# Patient Record
Sex: FEMALE
[De-identification: names, ages, dates, MRNs, and addresses within clinical notes are randomized per-mention and may not be internally consistent; named-entity substitution may affect disease eponyms.]

---

## 2023-08-03 ENCOUNTER — APPOINTMENT (OUTPATIENT)
Dept: ORTHOPEDIC SURGERY | Facility: CLINIC | Age: 78
End: 2023-08-03
Payer: MEDICARE

## 2023-08-03 VITALS — HEIGHT: 63 IN | WEIGHT: 120 LBS | BODY MASS INDEX: 21.26 KG/M2

## 2023-08-03 DIAGNOSIS — M72.0 PALMAR FASCIAL FIBROMATOSIS [DUPUYTREN]: ICD-10-CM

## 2023-08-03 PROBLEM — Z00.00 ENCOUNTER FOR PREVENTIVE HEALTH EXAMINATION: Status: ACTIVE | Noted: 2023-08-03

## 2023-08-03 PROCEDURE — 99203 OFFICE O/P NEW LOW 30 MIN: CPT

## 2023-08-03 NOTE — ADDENDUM
[FreeTextEntry1] : I, Arianne Farfan, acted solely as a scribe for Dr. Moore on this date on 08/03/2023.

## 2023-08-03 NOTE — PHYSICAL EXAM
[de-identified] : - Constitutional: This is a female in no obvious distress.   - Psych: Patient is alert and oriented to person, place and time.  Patient has a normal mood and affect. - Cardiovascular: Normal pulses throughout the upper extremities.  No significant varicosities are noted in the upper extremities.  - Neuro: Strength and sensation are intact throughout the upper extremities.  Patient has normal coordination. - Respiratory:  Patient exhibits no evidence of shortness of breath or difficulty breathing. - Skin: No rashes, lesions, or other abnormalities are noted in the upper extremities.  ---  Examination of her left hand demonstrates Dupuytren's disease along the little finger ulnarly.  She has a cord along the ulnar aspect of the digit.  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  She has full flexion.  She is neurovascularly intact distally.

## 2023-08-03 NOTE — HISTORY OF PRESENT ILLNESS
[Right] : right hand dominant [FreeTextEntry1] : She comes in today for evaluation of left little finger little cyst that started years ago. She denies pain, numbness and tingling. Furthermore, she is unable to straighten her left little finger fully. She is able to bend her finger. Moreover, her  strength is good.

## 2023-08-03 NOTE — DISCUSSION/SUMMARY
[FreeTextEntry1] : She has findings consistent with left little finger Dupuytren's disease with a PIP joint flexion contracture.  She denies symptoms.  I had a discussion regarding today's visit, the prognosis of this diagnosis and treatment recommendations and options.  At this time, as she denies symptoms, she deferred any intervention.  I discussed with her the potential benefit of a Xiaflex injection in the future if warranted.  She will follow-up if her contracture progresses.  The patient has agreed to this plan of management and has expressed full understanding.  All questions were fully answered to the patient's satisfaction.  My cumulative time spent on this patient's visit included: Preparation for the visit, review of the medical records, review of pertinent diagnostic studies, examination and counseling of the patient on the above diagnosis, treatment plan and prognosis, orders of diagnostic tests, medications and/or appropriate procedures and documentation in the medical records of today's visit.

## 2023-08-03 NOTE — END OF VISIT
[FreeTextEntry3] : This note was written by Arianne Farfan on 08/03/2023 acting solely as a scribe for Dr. Valeriy Moore.   All medical record entries made by the Scribe were at my, Dr. Valeriy Moore, direction and personally dictated by me on 08/03/2023. I have personally reviewed the chart and agree that the record accurately reflects my personal performance of the history, physical exam, assessment and plan.

## 2024-09-30 ENCOUNTER — OFFICE (OUTPATIENT)
Facility: LOCATION | Age: 79
Setting detail: OPHTHALMOLOGY
End: 2024-09-30
Payer: MEDICARE

## 2024-09-30 DIAGNOSIS — H43.393: ICD-10-CM

## 2024-09-30 DIAGNOSIS — H16.223: ICD-10-CM

## 2024-09-30 DIAGNOSIS — H25.13: ICD-10-CM

## 2024-09-30 DIAGNOSIS — H52.4: ICD-10-CM

## 2024-09-30 DIAGNOSIS — H35.443: ICD-10-CM

## 2024-09-30 PROBLEM — H52.13 MYOPIA; BOTH EYES: Status: ACTIVE | Noted: 2024-09-30

## 2024-09-30 PROCEDURE — 92004 COMPRE OPH EXAM NEW PT 1/>: CPT | Performed by: OPHTHALMOLOGY

## 2024-09-30 PROCEDURE — 92015 DETERMINE REFRACTIVE STATE: CPT | Performed by: OPHTHALMOLOGY

## 2024-09-30 PROCEDURE — 92250 FUNDUS PHOTOGRAPHY W/I&R: CPT | Performed by: OPHTHALMOLOGY

## 2024-09-30 ASSESSMENT — CONFRONTATIONAL VISUAL FIELD TEST (CVF)
OD_FINDINGS: FULL
OS_FINDINGS: FULL

## 2024-10-01 ASSESSMENT — REFRACTION_TRIALFRAME
OS_ADD: +2.50
OD_VA1: 20/25
OD_AXIS: 165
OS_SPHERE: PLANO
OD_ADD: +2.50
OS_CYLINDER: +0.25
OD_VA2: 20/20
OS_VA2: 20/20
OD_SPHERE: -1.50
OD_CYLINDER: +1.00
OU_VA: 20/25
OS_VA1: 20/25-2
OS_AXIS: 120

## 2024-10-01 ASSESSMENT — KERATOMETRY
OD_K2POWER_DIOPTERS: 43.00
OD_AXISANGLE_DEGREES: 081
OS_AXISANGLE_DEGREES: 092
OD_K1POWER_DIOPTERS: 42.50
OS_K2POWER_DIOPTERS: 41.00
OS_K1POWER_DIOPTERS: 40.50

## 2025-04-03 ENCOUNTER — OFFICE (OUTPATIENT)
Facility: LOCATION | Age: 80
Setting detail: OPHTHALMOLOGY
End: 2025-04-03
Payer: MEDICARE

## 2025-04-03 DIAGNOSIS — H25.13: ICD-10-CM

## 2025-04-03 DIAGNOSIS — Z13.5: ICD-10-CM

## 2025-04-03 DIAGNOSIS — H16.223: ICD-10-CM

## 2025-04-03 DIAGNOSIS — H52.4: ICD-10-CM

## 2025-04-03 DIAGNOSIS — H35.443: ICD-10-CM

## 2025-04-03 DIAGNOSIS — H35.371: ICD-10-CM

## 2025-04-03 PROCEDURE — 92015 DETERMINE REFRACTIVE STATE: CPT | Performed by: OPHTHALMOLOGY

## 2025-04-03 PROCEDURE — 99213 OFFICE O/P EST LOW 20 MIN: CPT | Performed by: OPHTHALMOLOGY

## 2025-04-03 PROCEDURE — 92250 FUNDUS PHOTOGRAPHY W/I&R: CPT | Mod: GY | Performed by: OPHTHALMOLOGY

## 2025-04-03 PROCEDURE — 92134 CPTRZ OPH DX IMG PST SGM RTA: CPT | Performed by: OPHTHALMOLOGY

## 2025-04-03 ASSESSMENT — TONOMETRY
OS_IOP_MMHG: 16
OD_IOP_MMHG: 16

## 2025-04-03 ASSESSMENT — SUPERFICIAL PUNCTATE KERATITIS (SPK)
OD_SPK: T
OS_SPK: T

## 2025-04-08 ASSESSMENT — REFRACTION_MANIFEST
OS_ADD: +2.50
OS_VA1: 20/25
OD_VA1: 20/30
OS_SPHERE: PL
OU_VA: 20/25
OD_ADD: +2.50
OD_SPHERE: -2.25
OS_CYLINDER: +0.25
OD_CYLINDER: +0.75
OS_VA2: 20/25(J1)
OD_AXIS: 165
OD_VA2: 20/25(J1)
OS_AXIS: 120

## 2025-04-08 ASSESSMENT — REFRACTION_TRIALFRAME
OS_AXIS: 120
OD_AXIS: 165
OS_CYLINDER: +0.25
OD_VA2: 20/20
OD_CYLINDER: +1.00
OD_ADD: +2.50
OS_ADD: +2.50
OS_VA2: 20/20
OD_SPHERE: -1.50
OU_VA: 20/25
OD_VA1: 20/25
OS_VA1: 20/25-2
OS_SPHERE: PLANO

## 2025-04-08 ASSESSMENT — KERATOMETRY
OD_AXISANGLE_DEGREES: 091
OS_K1POWER_DIOPTERS: 41.00
OS_AXISANGLE_DEGREES: 085
OS_K2POWER_DIOPTERS: 41.50
OD_K1POWER_DIOPTERS: 42.50
OD_K2POWER_DIOPTERS: 43.25

## 2025-04-08 ASSESSMENT — REFRACTION_AUTOREFRACTION
OD_SPHERE: -1.50
OS_CYLINDER: +0.25
OS_SPHERE: 0.00
OD_CYLINDER: +0.75
OS_AXIS: 135
OD_AXIS: 145

## 2025-04-08 ASSESSMENT — VISUAL ACUITY
OS_BCVA: 20/50
OD_BCVA: 20/30-1

## 2025-05-08 ENCOUNTER — OFFICE (OUTPATIENT)
Facility: LOCATION | Age: 80
Setting detail: OPHTHALMOLOGY
End: 2025-05-08
Payer: MEDICARE

## 2025-05-08 ENCOUNTER — RX ONLY (RX ONLY)
Age: 80
End: 2025-05-08

## 2025-05-08 DIAGNOSIS — H43.821: ICD-10-CM

## 2025-05-08 DIAGNOSIS — H35.371: ICD-10-CM

## 2025-05-08 DIAGNOSIS — Z01.818: ICD-10-CM

## 2025-05-08 DIAGNOSIS — H25.11: ICD-10-CM

## 2025-05-08 PROBLEM — H25.13 CATARACT SENILE NUCLEAR SCLEROSIS; RIGHT EYE, BOTH EYES: Status: ACTIVE | Noted: 2025-05-08

## 2025-05-08 PROCEDURE — ORB PRE OP CORNEA SCREENING: Performed by: OPHTHALMOLOGY

## 2025-05-08 PROCEDURE — 92136 OPHTHALMIC BIOMETRY: CPT | Mod: RT | Performed by: OPHTHALMOLOGY

## 2025-05-08 PROCEDURE — 92134 CPTRZ OPH DX IMG PST SGM RTA: CPT | Performed by: OPHTHALMOLOGY

## 2025-05-08 PROCEDURE — 99214 OFFICE O/P EST MOD 30 MIN: CPT | Performed by: OPHTHALMOLOGY

## 2025-05-08 PROCEDURE — ECC PRE OP CORNEA CELL COUNT SCREENING: Performed by: OPHTHALMOLOGY

## 2025-05-08 ASSESSMENT — KERATOMETRY
OD_CYLPOWER_DEGREES: 1.25
OD_AXISANGLE2_DEGREES: 134
OS_AXISANGLE_DEGREES: 094
OD_CYLAXISANGLE_DEGREES: 134
OD_K1POWER_DIOPTERS: 42.75
OS_CYLPOWER_DEGREES: 0.25
OD_K2POWER_DIOPTERS: 44.00
OD_K2POWER_DIOPTERS: 44.00
OS_K1K2_AVERAGE: 40.875
OS_K2POWER_DIOPTERS: 41.00
OS_AXISANGLE2_DEGREES: 094
OS_K1POWER_DIOPTERS: 40.75
OS_K2POWER_DIOPTERS: 41.00
OS_CYLAXISANGLE_DEGREES: 094
OD_K1POWER_DIOPTERS: 42.75
OD_K1K2_AVERAGE: 43.375
OD_AXISANGLE_DEGREES: 134
OS_AXISANGLE_DEGREES: 094
OS_K1POWER_DIOPTERS: 40.75
OD_AXISANGLE_DEGREES: 134

## 2025-05-08 ASSESSMENT — REFRACTION_MANIFEST
OS_AXIS: 120
OD_VA1: 20/30
OD_CYLINDER: +0.75
OS_VA2: 20/25(J1)
OS_ADD: +2.50
OS_CYLINDER: +0.25
OD_AXIS: 165
OS_SPHERE: PL
OS_VA1: 20/25
OU_VA: 20/25
OD_ADD: +2.50
OD_SPHERE: -2.25
OD_VA2: 20/25(J1)

## 2025-05-08 ASSESSMENT — SUPERFICIAL PUNCTATE KERATITIS (SPK)
OD_SPK: T
OS_SPK: T

## 2025-05-08 ASSESSMENT — REFRACTION_TRIALFRAME
OS_SPHERE: PLANO
OD_SPHERE: -1.50
OD_AXIS: 165
OD_CYLINDER: +1.00
OS_ADD: +2.50
OD_VA1: 20/25
OS_CYLINDER: +0.25
OD_VA2: 20/20
OU_VA: 20/25
OD_ADD: +2.50
OS_AXIS: 120
OS_VA2: 20/20
OS_VA1: 20/25-2

## 2025-05-08 ASSESSMENT — REFRACTION_AUTOREFRACTION
OD_CYLINDER: +1.25
OD_SPHERE: -1.75
OS_CYLINDER: +0.25
OS_SPHERE: +0.25
OS_AXIS: 154
OD_AXIS: 123

## 2025-05-08 ASSESSMENT — VISUAL ACUITY
OD_BCVA: 20/25+1
OS_BCVA: 20/50-2

## 2025-06-11 ENCOUNTER — ASC (OUTPATIENT)
Dept: URBAN - METROPOLITAN AREA SURGERY 12 | Facility: SURGERY | Age: 80
Setting detail: OPHTHALMOLOGY
End: 2025-06-11
Payer: MEDICARE

## 2025-06-11 DIAGNOSIS — H25.11: ICD-10-CM

## 2025-06-11 DIAGNOSIS — H52.211: ICD-10-CM

## 2025-06-11 PROCEDURE — FEMTO PRECISION LASER CATARACT SURGERY: Mod: GY | Performed by: OPHTHALMOLOGY

## 2025-06-11 PROCEDURE — 66984 XCAPSL CTRC RMVL W/O ECP: CPT | Mod: RT | Performed by: OPHTHALMOLOGY

## 2025-06-12 ENCOUNTER — RX ONLY (RX ONLY)
Age: 80
End: 2025-06-12

## 2025-06-12 ENCOUNTER — OFFICE (OUTPATIENT)
Facility: LOCATION | Age: 80
Setting detail: OPHTHALMOLOGY
End: 2025-06-12
Payer: MEDICARE

## 2025-06-12 DIAGNOSIS — Z96.1: ICD-10-CM

## 2025-06-12 DIAGNOSIS — H11.31: ICD-10-CM

## 2025-06-12 PROCEDURE — 99024 POSTOP FOLLOW-UP VISIT: CPT | Performed by: OPTOMETRIST

## 2025-06-12 ASSESSMENT — CORNEAL EDEMA CLINICAL DESCRIPTION: OD_CORNEALEDEMA: T

## 2025-06-12 ASSESSMENT — SUPERFICIAL PUNCTATE KERATITIS (SPK)
OD_SPK: T
OS_SPK: T

## 2025-06-13 ASSESSMENT — REFRACTION_TRIALFRAME
OD_AXIS: 165
OD_SPHERE: -1.50
OS_ADD: +2.50
OS_VA2: 20/20
OU_VA: 20/25
OD_VA2: 20/20
OS_SPHERE: PLANO
OS_VA1: 20/25-2
OD_CYLINDER: +1.00
OS_CYLINDER: +0.25
OD_VA1: 20/25
OS_AXIS: 120
OD_ADD: +2.50

## 2025-06-13 ASSESSMENT — REFRACTION_AUTOREFRACTION
OD_SPHERE: -1.75
OS_SPHERE: +0.25
OS_CYLINDER: +0.25
OS_AXIS: 154
OD_AXIS: 123
OD_CYLINDER: +1.25

## 2025-06-13 ASSESSMENT — REFRACTION_MANIFEST
OD_AXIS: 165
OD_VA1: 20/30
OS_SPHERE: PL
OD_CYLINDER: +0.75
OS_CYLINDER: +0.25
OS_AXIS: 120
OD_VA2: 20/25(J1)
OS_VA1: 20/25
OU_VA: 20/25
OD_ADD: +2.50
OS_VA2: 20/25(J1)
OS_ADD: +2.50
OD_SPHERE: -2.25

## 2025-06-13 ASSESSMENT — KERATOMETRY
OD_K2POWER_DIOPTERS: 44.00
OD_K1POWER_DIOPTERS: 42.75
OS_AXISANGLE_DEGREES: 094
OS_K2POWER_DIOPTERS: 41.00
OS_K1POWER_DIOPTERS: 40.75
OD_AXISANGLE_DEGREES: 134

## 2025-06-13 ASSESSMENT — VISUAL ACUITY
OD_BCVA: 20/50+2
OS_BCVA: 20/50+2

## 2025-06-19 ENCOUNTER — OFFICE (OUTPATIENT)
Facility: LOCATION | Age: 80
Setting detail: OPHTHALMOLOGY
End: 2025-06-19
Payer: MEDICARE

## 2025-06-19 ENCOUNTER — RX ONLY (RX ONLY)
Age: 80
End: 2025-06-19

## 2025-06-19 DIAGNOSIS — H52.11: ICD-10-CM

## 2025-06-19 DIAGNOSIS — H25.12: ICD-10-CM

## 2025-06-19 DIAGNOSIS — Z01.818: ICD-10-CM

## 2025-06-19 PROCEDURE — 92015 DETERMINE REFRACTIVE STATE: CPT | Performed by: OPHTHALMOLOGY

## 2025-06-19 PROCEDURE — 99213 OFFICE O/P EST LOW 20 MIN: CPT | Mod: 24 | Performed by: OPHTHALMOLOGY

## 2025-06-19 PROCEDURE — 92136 OPHTHALMIC BIOMETRY: CPT | Mod: LT | Performed by: OPHTHALMOLOGY

## 2025-06-19 RX ORDER — PREDNISOLONE/MOXIFLOX/BROMFEN 1 %-0.5 %
SUSPENSION, DROPS(FINAL DOSAGE FORM)(ML) OPHTHALMIC (EYE)
Qty: 10 | Refills: 1 | Status: ACTIVE | OUTPATIENT

## 2025-06-19 ASSESSMENT — REFRACTION_AUTOREFRACTION
OD_CYLINDER: +1.25
OS_AXIS: 121
OD_SPHERE: -3.00
OD_AXIS: 106
OS_SPHERE: +0.25
OS_CYLINDER: +1.25

## 2025-06-19 ASSESSMENT — KERATOMETRY
OS_AXISANGLE_DEGREES: 094
OS_AXISANGLE2_DEGREES: 094
OD_AXISANGLE_DEGREES: 134
OD_AXISANGLE_DEGREES: 134
OS_K2POWER_DIOPTERS: 41.00
OS_K1K2_AVERAGE: 40.875
OS_CYLPOWER_DEGREES: 0.25
OD_K1POWER_DIOPTERS: 42.75
OD_K1K2_AVERAGE: 43.375
OS_AXISANGLE_DEGREES: 094
OS_K1POWER_DIOPTERS: 40.75
OD_K2POWER_DIOPTERS: 44.00
OD_K1POWER_DIOPTERS: 42.75
OD_K2POWER_DIOPTERS: 44.00
OS_K2POWER_DIOPTERS: 41.00
OS_K1POWER_DIOPTERS: 40.75
OD_CYLAXISANGLE_DEGREES: 134
OD_AXISANGLE2_DEGREES: 134
OD_CYLPOWER_DEGREES: 1.25
OS_CYLAXISANGLE_DEGREES: 94

## 2025-06-19 ASSESSMENT — REFRACTION_MANIFEST
OS_ADD: +2.50
OD_AXIS: 115
OD_VA1: 20/30
OD_CYLINDER: +0.75
OD_AXIS: 165
OD_VA2: 20/25(J1)
OS_AXIS: 120
OD_ADD: +2.50
OD_CYLINDER: +0.50
OU_VA: 20/25
OD_SPHERE: -3.00
OS_VA1: 20/25
OS_CYLINDER: +0.25
OD_SPHERE: -2.25
OS_VA2: 20/25(J1)
OD_VA1: 20/20-1
OS_SPHERE: PL

## 2025-06-19 ASSESSMENT — REFRACTION_TRIALFRAME
OD_CYLINDER: +1.00
OS_ADD: +2.50
OS_VA2: 20/20
OS_VA1: 20/25-2
OS_CYLINDER: +0.25
OD_AXIS: 165
OS_SPHERE: PLANO
OD_SPHERE: -1.50
OD_VA1: 20/25
OD_VA2: 20/20
OU_VA: 20/25
OS_AXIS: 120
OD_ADD: +2.50

## 2025-06-19 ASSESSMENT — VISUAL ACUITY
OS_BCVA: 20/150
OD_BCVA: 20/50+2

## 2025-06-19 ASSESSMENT — TONOMETRY: OD_IOP_MMHG: 12

## 2025-06-19 ASSESSMENT — SUPERFICIAL PUNCTATE KERATITIS (SPK)
OS_SPK: T
OD_SPK: T

## 2025-06-25 ENCOUNTER — ASC (OUTPATIENT)
Dept: URBAN - METROPOLITAN AREA SURGERY 12 | Facility: SURGERY | Age: 80
Setting detail: OPHTHALMOLOGY
End: 2025-06-25
Payer: MEDICARE

## 2025-06-25 DIAGNOSIS — H52.212: ICD-10-CM

## 2025-06-25 DIAGNOSIS — H25.12: ICD-10-CM

## 2025-06-25 PROCEDURE — 66984 XCAPSL CTRC RMVL W/O ECP: CPT | Mod: 79,LT | Performed by: OPHTHALMOLOGY

## 2025-06-25 PROCEDURE — FEMTO PRECISION LASER CATARACT SURGERY: Mod: GY | Performed by: OPHTHALMOLOGY

## 2025-06-26 ENCOUNTER — OFFICE (OUTPATIENT)
Facility: LOCATION | Age: 80
Setting detail: OPHTHALMOLOGY
End: 2025-06-26
Payer: MEDICARE

## 2025-06-26 DIAGNOSIS — Z96.1: ICD-10-CM

## 2025-06-26 PROCEDURE — 99024 POSTOP FOLLOW-UP VISIT: CPT | Performed by: OPTOMETRIST

## 2025-06-26 ASSESSMENT — KERATOMETRY
OS_K1POWER_DIOPTERS: 40.75
OD_K1POWER_DIOPTERS: 42.75
OD_AXISANGLE_DEGREES: 134
OS_K2POWER_DIOPTERS: 41.00
OS_AXISANGLE_DEGREES: 094
OD_K2POWER_DIOPTERS: 44.00

## 2025-06-26 ASSESSMENT — REFRACTION_TRIALFRAME
OS_VA1: 20/25-2
OU_VA: 20/25
OD_VA1: 20/25
OS_CYLINDER: +0.25
OD_VA2: 20/20
OS_VA2: 20/20
OS_AXIS: 120
OD_CYLINDER: +1.00
OS_ADD: +2.50
OD_ADD: +2.50
OS_SPHERE: PLANO
OD_SPHERE: -1.50
OD_AXIS: 165

## 2025-06-26 ASSESSMENT — REFRACTION_MANIFEST
OD_CYLINDER: +0.50
OS_VA1: 20/25
OD_SPHERE: -3.00
OS_ADD: +2.50
OD_ADD: +2.50
OD_SPHERE: -2.25
OU_VA: 20/25
OD_VA1: 20/20-1
OD_VA1: 20/30
OS_VA2: 20/25(J1)
OD_AXIS: 165
OD_AXIS: 115
OS_AXIS: 120
OS_CYLINDER: +0.25
OS_SPHERE: PL
OD_VA2: 20/25(J1)
OD_CYLINDER: +0.75

## 2025-06-26 ASSESSMENT — VISUAL ACUITY
OD_BCVA: 20/20-2
OS_BCVA: 20/150

## 2025-06-26 ASSESSMENT — REFRACTION_AUTOREFRACTION
OD_CYLINDER: +1.25
OS_SPHERE: +0.25
OD_SPHERE: -3.00
OD_AXIS: 106
OS_CYLINDER: +1.25
OS_AXIS: 121

## 2025-06-26 ASSESSMENT — CORNEAL EDEMA CLINICAL DESCRIPTION: OS_CORNEALEDEMA: T

## 2025-06-26 ASSESSMENT — SUPERFICIAL PUNCTATE KERATITIS (SPK)
OD_SPK: T
OS_SPK: T

## 2025-07-01 ENCOUNTER — OFFICE (OUTPATIENT)
Facility: LOCATION | Age: 80
Setting detail: OPHTHALMOLOGY
End: 2025-07-01
Payer: MEDICARE

## 2025-07-01 DIAGNOSIS — Z96.1: ICD-10-CM

## 2025-07-01 PROBLEM — H16.222 DRY EYE SYNDROME K SICCA;  ,,  , LEFT EYE: Status: ACTIVE | Noted: 2025-06-26

## 2025-07-01 PROBLEM — H11.31 SUB-CONJUNCTIVAL HEMORRHAGE; RIGHT EYE: Status: ACTIVE | Noted: 2025-06-12

## 2025-07-01 PROCEDURE — 99024 POSTOP FOLLOW-UP VISIT: CPT | Performed by: OPTOMETRIST

## 2025-07-01 ASSESSMENT — REFRACTION_TRIALFRAME
OU_VA: 20/25
OS_CYLINDER: +0.25
OS_ADD: +2.50
OD_AXIS: 165
OS_VA2: 20/20
OD_VA2: 20/20
OD_ADD: +2.50
OS_VA1: 20/25-2
OS_AXIS: 120
OD_CYLINDER: +1.00
OD_VA1: 20/25
OS_SPHERE: PLANO
OD_SPHERE: -1.50

## 2025-07-01 ASSESSMENT — REFRACTION_MANIFEST
OS_VA1: 20/25
OS_ADD: +2.50
OS_SPHERE: PL
OD_CYLINDER: +0.50
OS_VA2: 20/25(J1)
OD_VA2: 20/25(J1)
OD_VA1: 20/20-1
OD_VA1: 20/30
OD_AXIS: 165
OU_VA: 20/25
OS_CYLINDER: +0.25
OD_SPHERE: -2.25
OD_CYLINDER: +0.75
OD_SPHERE: -3.00
OS_AXIS: 120
OD_AXIS: 115
OD_ADD: +2.50

## 2025-07-01 ASSESSMENT — CONFRONTATIONAL VISUAL FIELD TEST (CVF)
OS_FINDINGS: FULL
OD_FINDINGS: FULL

## 2025-07-01 ASSESSMENT — KERATOMETRY
OS_K2POWER_DIOPTERS: 41.00
OS_K1POWER_DIOPTERS: 40.75
OD_K2POWER_DIOPTERS: 44.00
OD_AXISANGLE_DEGREES: 134
OS_AXISANGLE_DEGREES: 094
OD_K1POWER_DIOPTERS: 42.75

## 2025-07-01 ASSESSMENT — REFRACTION_AUTOREFRACTION
OD_SPHERE: -3.00
OS_CYLINDER: +1.25
OS_SPHERE: +0.25
OS_AXIS: 121
OD_AXIS: 106
OD_CYLINDER: +1.25

## 2025-07-01 ASSESSMENT — SUPERFICIAL PUNCTATE KERATITIS (SPK)
OS_SPK: T
OD_SPK: T

## 2025-07-01 ASSESSMENT — CORNEAL EDEMA CLINICAL DESCRIPTION: OS_CORNEALEDEMA: T

## 2025-07-01 ASSESSMENT — VISUAL ACUITY
OS_BCVA: 20/150
OD_BCVA: 20/40

## 2025-07-01 ASSESSMENT — TONOMETRY: OS_IOP_MMHG: 14

## 2025-07-15 ENCOUNTER — OFFICE (OUTPATIENT)
Facility: LOCATION | Age: 80
Setting detail: OPHTHALMOLOGY
End: 2025-07-15
Payer: MEDICARE

## 2025-07-15 DIAGNOSIS — Z96.1: ICD-10-CM

## 2025-07-15 PROBLEM — H16.223 DRY EYE SYNDROME K SICCA;  ,,  ,, BOTH EYES: Status: ACTIVE | Noted: 2025-07-15

## 2025-07-15 PROCEDURE — 99024 POSTOP FOLLOW-UP VISIT: CPT | Performed by: OPHTHALMOLOGY

## 2025-07-15 ASSESSMENT — CORNEAL EDEMA CLINICAL DESCRIPTION: OS_CORNEALEDEMA: T

## 2025-07-15 ASSESSMENT — TONOMETRY
OS_IOP_MMHG: 15
OD_IOP_MMHG: 15

## 2025-07-15 ASSESSMENT — SUPERFICIAL PUNCTATE KERATITIS (SPK)
OD_SPK: T
OS_SPK: T

## 2025-07-15 ASSESSMENT — CONFRONTATIONAL VISUAL FIELD TEST (CVF)
OS_FINDINGS: FULL
OD_FINDINGS: FULL

## 2025-07-16 ASSESSMENT — REFRACTION_MANIFEST
OD_AXIS: 115
OS_SPHERE: PL
OD_CYLINDER: +0.50
OD_SPHERE: -2.25
OS_CYLINDER: +0.25
OD_SPHERE: -3.00
OS_ADD: +2.50
OS_AXIS: 120
OD_ADD: +2.50
OD_VA2: 20/25(J1)
OD_VA1: 20/20-1
OD_AXIS: 165
OS_VA1: 20/25
OD_CYLINDER: +0.75
OD_VA1: 20/30
OU_VA: 20/25
OS_VA2: 20/25(J1)

## 2025-07-16 ASSESSMENT — KERATOMETRY
OS_AXISANGLE_DEGREES: 094
OD_K1POWER_DIOPTERS: 42.75
OS_K2POWER_DIOPTERS: 41.00
OD_AXISANGLE_DEGREES: 134
OD_K2POWER_DIOPTERS: 44.00
OS_K1POWER_DIOPTERS: 40.75

## 2025-07-16 ASSESSMENT — VISUAL ACUITY
OD_BCVA: 20/25-
OS_BCVA: 20/150

## 2025-07-16 ASSESSMENT — REFRACTION_AUTOREFRACTION
OD_SPHERE: -2.50
OS_CYLINDER: +0.50
OS_SPHERE: -0.25
OD_AXIS: 114
OS_AXIS: 121
OD_CYLINDER: +0.25

## 2025-07-16 ASSESSMENT — REFRACTION_TRIALFRAME
OS_SPHERE: PLANO
OS_VA1: 20/25-2
OU_VA: 20/25
OS_VA2: 20/20
OD_CYLINDER: +1.00
OD_SPHERE: -1.50
OS_ADD: +2.50
OD_VA1: 20/25
OD_VA2: 20/20
OS_AXIS: 120
OD_AXIS: 165
OS_CYLINDER: +0.25
OD_ADD: +2.50